# Patient Record
Sex: FEMALE | Race: WHITE
[De-identification: names, ages, dates, MRNs, and addresses within clinical notes are randomized per-mention and may not be internally consistent; named-entity substitution may affect disease eponyms.]

---

## 2020-11-30 NOTE — RAD
Exam: Acute abdominal series

 

INDICATION: Severe abdominal pain, nausea

 

TECHNIQUE: Frontal view of chest with upright and supine views the abdomen

 

Comparisons: None

 

FINDINGS:

The cardiomediastinal silhouette and pulmonary vessels are within normal 

limits.

 

The lung and pleural spaces are clear.

 

Air and stool are noted throughout the colon to level the rectum in a 

nonobstructive bowel gas pattern.

 

No suspicious masses or calcifications.

 

Visualized osseous structures are unremarkable.

 

IMPRESSION:

1.  No acute cardiopulmonary process.

2.  Nonobstructive bowel gas pattern.

 

Electronically signed by: Yajaira Malik MD (11/30/2020 11:23 PM) [facility]

## 2020-11-30 NOTE — EKG
[facility] ED

                    [facility] [address] [place] [id number]

Test Date:    2020               Test Time:    22:23:41

Pat Name:     YAYO MENA        Department:   

Patient ID:   [id number]           Room:          

Gender:       F                        Technician:   DOREEN

:          1965               Requested By: MARINA LUCIANO

Order Number: [id number]            Reading MD:   Patrick Salinas

                                 Measurements

Intervals                              Axis          

Rate:         82                       P:            57

DC:           148                      QRS:          81

QRSD:         84                       T:            46

QT:           346                                    

QTc:          407                                    

                           Interpretive Statements

SINUS RHYTHM

INCOMPLETE RIGHT BUNDLE BRANCH BLOCK

Electronically Signed On 2020 10:34:05 CST by Patrick Salinas

## 2020-11-30 NOTE — PHYS DOC
Past History


Past Medical History:  Diabetes, Hypertension, Hypothyroid, Ovarian Cyst, UTI


Past Surgical History:  , Hysterectomy, Oophorectomy


Past Surgical History


Removal of a 19 inch ovarian cyst 


Alcohol Use:  None





General Adult


EDM:


Chief Complaint:  ABDOMINAL PAIN





HPI:


HPI:


".. I ve been hurting since Saturday.. I been taking some tylenol... but the 

pain in much worse tonight... It seem at lst better... only padmini constant on 

the Rt. and mid.. ... but I play Darts...three times a week.. .  for money.. and

and and competition.. usually at [employer]..and every Monday night at [facility] 

[employer]#..but I went to bend over and  a dart... the pain became so 

severe... I almost passed out..."





Patient is a 55 year old female who presents with above hx and complaints of 

abdomen pain since Sat.  Night.  Patient states pain initially mid and right 

lower abdomen area.  Movement made pain worse.  Has been somewhat intermittent 

but persistent tonight.  Patient has taken Tylenol for pain with some relief but

no relief in the last 2 hours.Patient states she has passed stool and gas today.

 Has had previous abdomen surgeries of a , hysterectomy / oophorectomy 

and removal of a 19 inch ovarian cyst.   .  The   patient has a history of 

diabetes which he takes Metformin.  Patient does have a history of hypertension.

 No history of colitis with her family members.  No history of renal stones with

her or family members.  No history of vaginal discharge or any symptoms of STD. 

No history of bad food intake recently.  No history of immunosuppression. No 

recent travel outside the [place] area.  No specific ill contacts.  Patient 

states she did have a negative Covid test 2 months ago.  Patient normally 

follows at [facility].





Review of Systems:


Review of Systems:


Constitutional:  Denies fever or chills 


Eyes:  Denies change in visual acuity 


HENT:  Denies nasal congestion or sore throat 


Respiratory:  Denies cough or shortness of breath 


Cardiovascular:  Denies chest pain or edema 


GI: Complains of severe abdominal pain, nausea,.  Denies vomiting, bloody stools

or diarrhea 


: Denies dysuria 


Musculoskeletal:  Denies back pain or joint pain 


Integument:  Denies rash 


Neurologic:  Denies headache, focal weakness or sensory changes 


Endocrine:  Denies polyuria or polydipsia 


Lymphatic:  Denies swollen glands 


Psychiatric:  Denies depression or anxiety





Family History:


Family History:


Noncontributory to presentation





Current Medications:


Current Meds:


See nursing for home meds.





Allergies:


Allergies:





Allergies








Coded Allergies Type Severity Reaction Last Updated Verified


 


  No Known Drug Allergies    20 No











Physical Exam:


PE:





Constitutional: Moderate severe acute distress, non-toxic appearance. []


HENT: Normocephalic, atraumatic, bilateral external ears normal, oropharynx 

moist, no oral exudates, nose normal. []


Eyes: PERRLA, EOMI, conjunctiva normal, no discharge.  Glasses


Neck: Normal range of motion, no tenderness, supple, no stridor. [] 


Cardiovascular:Heart rate regular rhythm, no murmur []


Lungs & Thorax:  Bilateral breath sounds equal apex with scattered wheezes on 

auscultation []


Abdomen: Bowel sounds decreased, soft, marked generalized tenderness, no flank 

tenderness, no masses, no pulsatile masses.  Old surgery scars.


Skin: Warm, dry, no erythema, no rash. [] 


Back: No tenderness, no CVA tenderness. [] 


Extremities: No tenderness, no cyanosis, no clubbing, ROM intact, no edema.  

Mild psoas on right.


Neurologic: Alert and oriented X 3, normal motor function, normal sensory 

function, no focal deficits noted. []


Psychologic: Affect anxious, judgement normal, mood normal. []





Current Patient Data:


Vital Signs:





                                   Vital Signs








  Date Time  Temp Pulse Resp B/P (MAP) Pulse Ox O2 Delivery O2 Flow Rate FiO2


 


20 21:26  95 24 187/86 ([phone] 96   











EKG:


EKG:


My interpretation EKG shows a sinus rhythm at 82 bpm.  Is incomplete right 

bundle branch block.  No findings of acute STEMI with contralateral changes.  []





Radiology/Procedures:


Radiology/Procedures:


[[facility] [address], [place] [address] [phone]


                                        


                                 IMAGING REPORT





                                     Signed





PATIENT: YAYO MENA MACCOUNT: [id number]     MRN#: [id number]


: 1965           LOCATION: ER              AGE: 55


SEX: F                    EXAM DT: 20         ACCESSION#: [id number]


STATUS: REG ER            ORD. PHYSICIAN: MARINA LUCIANO MD


REASON: pain,  n, OMNI 300, 75ml & OMNI 240, 30ml


PROCEDURE: CT ABD PELV W/ORAL&IV CONTRAST





CT ABD PELV W/ORAL IV CONTRAST 


 


History: Pain, nausea  


 


Comparison: None.


 


Technique: After administration of intravenous contrast, helical CT of the


abdomen and pelvis was performed from the lung bases through the ischial 


tuberosities. Coronal and sagittal reconstructions were obtained. 75 mL of


Omnipaque 300 were used. One or more of the following dose reduction 


techniques were utilized: Automated exposure control (AEC), Adjustment of 


mA and/or kV according to patient size, Use of iterative reconstruction 


technique such as ASiR, CT scan done according to ALARA and image 


gently/image wisely


 


Abdomen Findings:


The visualized lung bases are clear.


 


The liver, gallbladder, pancreas, spleen, and bilateral adrenal glands are


normal. 


 


Symmetric renal enhancement. There is no focal renal mass. There is no 


hydronephrosis.   


 


The visualized loops of small bowel are normal. The visualized loops of 


large bowel are normal. There is no evidence of bowel obstruction. 


Appendix is normal. 


 


There is no free fluid.


 


There is no mesenteric or retroperitoneal adenopathy. 


 


Ectatic infrarenal abdominal aorta with ulcerated plaques measures 2.9 cm 


in diameter. Mild indistinctness/haziness of the wall of the aorta along 


the anterior aspect of the ectatic segment. Extensive calcified and 


noncalcified atherosclerotic disease, particularly at the aortic 


bifurcation.


 


Pelvis Findings:


Urinary bladder is normal. No pelvic free fluid. There is no pelvic or 


inguinal adenopathy.  


 


There is no acute bony abnormality.  


 


IMPRESSION:


 


1. Normal caliber large and small bowel. Normal appendix. No 


hydronephrosis or obstructing ureteral calculi.


 


2. Ectatic infrarenal abdominal aorta measures 2.9 cm. Mild 


indistinctness/haziness along the anterior wall, nonspecific but 


considerations would include acute or chronic periaortitis/aortitis or 


other aortic pathology. Clinical correlation is advised.


 


3. Extensive aortoiliac atherosclerotic disease, particularly in the 


region of the aortic bifurcation where there is some degree of underlying 


stenosis.


 


Electronically signed by: Parish Rivera MD (2020 2:07 AM) [facility]














DICTATED AND SIGNED BY:     PARISH RIVERA MD


DATE:     20 0200





CC: MARINA LUCIANO MD; PCP,NO ~MTH0 0][facility] [address], [place] [address] [phone]


                                        


                                 IMAGING REPORT





                                     Signed





PATIENT: YAYO MENAUNT: [id number]     MRN#: [id number]


: 1965           LOCATION: ER              AGE: 55


SEX: F                    EXAM DT: 20         ACCESSION#: [id number]


STATUS: PRE ER            ORD. PHYSICIAN: MARINA LUCIANO MD


REASON: severe abd. pain, NAUSEA


PROCEDURE: ACUTE ABDOMEN SERIES





Exam: Acute abdominal series


 


INDICATION: Severe abdominal pain, nausea


 


TECHNIQUE: Frontal view of chest with upright and supine views the abdomen


 


Comparisons: None


 


FINDINGS:


The cardiomediastinal silhouette and pulmonary vessels are within normal 


limits.


 


The lung and pleural spaces are clear.


 


Air and stool are noted throughout the colon to level the rectum in a 


nonobstructive bowel gas pattern.


 


No suspicious masses or calcifications.


 


Visualized osseous structures are unremarkable.


 


IMPRESSION:


1.  No acute cardiopulmonary process.


2.  Nonobstructive bowel gas pattern.


 


Electronically signed by: Yajaira Fraire MD (2020 11:23 PM) [facility]














DICTATED AND SIGNED BY:     YAJAIRA FRAIRE MD


DATE:     20





CC: MARINA LUCIANO MD; PCP,NO ~MTH0 0





Heart Score:


HEART Score for Chest Pain:  








HEART Score for Chest Pain Response (Comments) Value


 


History Moderately Suspicious 1


 


ECG Nonspecific Repolarizatio 1


 


Age >45 - < 65 1


 


Risk Factors                            1 or 2 Risk Factors 1


 


Troponin < Normal Limit 0


 


Total  4








Risk Factors:


Risk Factors:  DM, Current or recent (<one month) smoker, HTN, HLP, family 

history of CAD, obesity.


Risk Scores:


Score 0 - 3:  2.5% MACE over next 6 weeks - Discharge Home


Score 4 - 6:  20.3% MACE over next 6 weeks - Admit for Clinical Observation


Score 7 - 10:  72.7% MACE over next 6 weeks - Early Invasive Strategies





Course & Med Decision Making:


Course & Med Decision Making


Pertinent Labs and Imaging studies reviewed. (See chart for details)





Re-check pt.  Still pain lower abd.   4/10 up 8/10 with movement.  Will order 

CT.  


Plan hold Metaformin.





Patient remain on a clear fluid diet for the next 48 hours.  No solids or milk 

products.  Must allow bowel rest.  Patient take Tylenol and ibuprofen for pain. 

Patient push fluids.   Reexam if no improvement. Hold Metformin.    Practice 

social isolation.  Wear a mask that covers your nose and mouth at all times when

outside your home.  Follow-up primary care.  Return if any concerns.  May take 

Zofran 8 mg up to 4 times a day for active vomiting.  If no improvement must 

have reexam.








Impression:





1. Abdomen Pain


2. Diabetes  glucose  137


3. UTI


4. Viral syndrome








[]





Dragon Disclaimer:


Dragon Disclaimer:


This electronic medical record was generated, in whole or in part, using a voice

 recognition dictation system.





Departure


Departure:


Referrals:  


PCP,BRYN (PCP)


Scripts


Ondansetron Hcl (ZOFRAN) 4 Mg Tablet


8 MG PO QIDPRN PRN for nv, #30 TAB


   Prov: MARINA LUCIANO MD         20 


Cephalexin (KEFLEX) 500 Mg Capsule


500 MG PO TID for uti for 7 Days, BOTTLE


   Prov: MARINA LUCIANO MD         20





Dragon Disclaimer


This chart was dictated in whole or in part using Voice Recognition software in 

a busy, high-work load, and often noisy Emergency Department environment.  It 

may contain unintended and wholly unrecognized errors or omissions.





Dragon Disclaimer


This chart was dictated in whole or in part using Voice Recognition software in 

a busy, high-work load, and often noisy Emergency Department environment.  It 

may contain unintended and wholly unrecognized errors or omissions.





Dragon Disclaimer


This chart was dictated in whole or in part using Voice Recognition software in 

a busy, high-work load, and often noisy Emergency Department environment.  It 

may contain unintended and wholly unrecognized errors or omissions.











MARINA LUCIANO MD           2020 21:45

## 2020-12-01 VITALS
SYSTOLIC BLOOD PRESSURE: 114 MMHG | DIASTOLIC BLOOD PRESSURE: 57 MMHG | SYSTOLIC BLOOD PRESSURE: 114 MMHG | DIASTOLIC BLOOD PRESSURE: 57 MMHG

## 2020-12-01 NOTE — RAD
CT ABD PELV W/ORAL IV CONTRAST 

 

History: Pain, nausea  

 

Comparison: None.

 

Technique: After administration of intravenous contrast, helical CT of the

abdomen and pelvis was performed from the lung bases through the ischial 

tuberosities. Coronal and sagittal reconstructions were obtained. 75 mL of

Omnipaque 300 were used. One or more of the following dose reduction 

techniques were utilized: Automated exposure control (AEC), Adjustment of 

mA and/or kV according to patient size, Use of iterative reconstruction 

technique such as ASiR, CT scan done according to ALARA and image 

gently/image wisely

 

Abdomen Findings:

The visualized lung bases are clear.

 

The liver, gallbladder, pancreas, spleen, and bilateral adrenal glands are

normal. 

 

Symmetric renal enhancement. There is no focal renal mass. There is no 

hydronephrosis.   

 

The visualized loops of small bowel are normal. The visualized loops of 

large bowel are normal. There is no evidence of bowel obstruction. 

Appendix is normal. 

 

There is no free fluid.

 

There is no mesenteric or retroperitoneal adenopathy. 

 

Ectatic infrarenal abdominal aorta with ulcerated plaques measures 2.9 cm 

in diameter. Mild indistinctness/haziness of the wall of the aorta along 

the anterior aspect of the ectatic segment. Extensive calcified and 

noncalcified atherosclerotic disease, particularly at the aortic 

bifurcation.

 

Pelvis Findings:

Urinary bladder is normal. No pelvic free fluid. There is no pelvic or 

inguinal adenopathy.  

 

There is no acute bony abnormality.  

 

IMPRESSION:

 

1. Normal caliber large and small bowel. Normal appendix. No 

hydronephrosis or obstructing ureteral calculi.

 

2. Ectatic infrarenal abdominal aorta measures 2.9 cm. Mild 

indistinctness/haziness along the anterior wall, nonspecific but 

considerations would include acute or chronic periaortitis/aortitis or 

other aortic pathology. Clinical correlation is advised.

 

3. Extensive aortoiliac atherosclerotic disease, particularly in the 

region of the aortic bifurcation where there is some degree of underlying 

stenosis.

 

Electronically signed by: Cecilio Rivera MD (12/1/2020 2:07 AM) [facility]

## 2021-04-27 ENCOUNTER — HOSPITAL ENCOUNTER (OUTPATIENT)
Dept: HOSPITAL 63 - MAMMO | Age: 56
End: 2021-04-27
Payer: COMMERCIAL

## 2021-04-27 DIAGNOSIS — M47.816: ICD-10-CM

## 2021-04-27 DIAGNOSIS — Z12.31: Primary | ICD-10-CM

## 2021-04-27 PROCEDURE — 77067 SCR MAMMO BI INCL CAD: CPT

## 2021-04-27 PROCEDURE — 72100 X-RAY EXAM L-S SPINE 2/3 VWS: CPT

## 2021-04-28 NOTE — RAD
EXAM: Bilateral screening mammogram.



HISTORY: 55-year-old female presents for screening mammography.



TECHNIQUE: Full-field digital craniocaudal and mediolateral oblique views of both breasts are obtaine
d for evaluation. Computer aided detection was applied.



COMPARISON: None. This exam serves as a baseline mammogram.



BREAST PARENCHYMAL DENSITY: Level B - Scattered fibroglandular densities.



FINDINGS: There is a cluster of calcifications with indeterminate morphology within the posterior asp
ect of the left breast slightly lateral to the nipple in the craniocaudal projection. There are addit
ional scattered calcifications with benign morphology. There is no mass or suspicious architectural d
istortion.



IMPRESSION: BI-RADS Category 0: Incomplete. Additional imaging needed.



RECOMMENDATION: Further evaluation with a full field true and view and spot magnification craniocauda
l view of the left breast to assess my calcifications described above is recommended.



If your mammogram demonstrates that you have dense breast tissue, which could hide abnormalities, and
 if you have other risk factors for breast cancer that have been identified, you might benefit from s
upplemental screening tests that may be suggested by your ordering physician.  Dense breast tissue, i
n and of itself, is a relatively common condition.  This information is not provided to cause undue c
oncern, but rather to raise your awareness and to promote discussion with your physician regarding th
e presence of other risk factors, in addition to dense breast tissue. A report of your mammography re
sults will be sent to you and your physician.  You should contact your physician if you have any ques
tions or concerns regarding this report.  



Mammography is a sensitive method for finding small breast cancers, but it does not detect them all a
nd is not a substitute for careful clinical examination.  A negative mammogram does not negate a clin
ically suspicious finding and should not result in delay in biopsying a clinically suspicious abnorma
lity.



PQRS compliance statement -  Patient information was entered into a reminder system with a target due
 date for the next mammogram. 



"Our facility is accredited by the American College of [employer] Mammography Program."



Electronically signed by: Shanae Crowley MD (4/28/2021 8:50 AM) [id number]

## 2021-04-28 NOTE — RAD
3 views lumbar spine dated 4/27/2021.



No comparison available.



CLINICAL INDICATION: Low back pain.



FINDINGS:



3 views lumbar spine show normal sagittal alignment. Vertebral body heights are maintained. Mild endp
late hypertrophic changes throughout with moderate arthrosis lower lumbar apophyseal joints. No evide
nce of fracture. There is moderate to severe disc space narrowing at L5-S1



IMPRESSION:

1. No acute radiographic abnormality.

2. Mild lower lumbar spondylosis.



Electronically signed by: Pardeep Noonan MD (4/28/2021 4:52 AM) [facility]

## 2021-05-25 ENCOUNTER — HOSPITAL ENCOUNTER (OUTPATIENT)
Dept: HOSPITAL 63 - MAMMO | Age: 56
End: 2021-05-25
Attending: NURSE PRACTITIONER
Payer: COMMERCIAL

## 2021-05-25 DIAGNOSIS — R92.1: Primary | ICD-10-CM

## 2021-05-25 PROCEDURE — 77065 DX MAMMO INCL CAD UNI: CPT

## 2021-05-25 NOTE — RAD
EXAM: Left breast diagnostic mammogram.



HISTORY: 55-year-old female presents for evaluation of left breast microcalcifications.



TECHNIQUE: Spot magnification views of the left breast are obtained.



COMPARISON: 4/27/2021



BREAST PARENCHYMAL DENSITY: Level B - Scattered fibroglandular densities.



FINDINGS: There is a cluster of microcalcifications within the posterior left breast slightly lateral
 to the nipple line. These demonstrate benign morphology and dedicated magnification views. There are
 few additional scattered calcifications with similar morphology. No mass or architectural distortion
 is seen.



IMPRESSION: BI-RADS Category 2: Benign finding(s). 



RECOMMENDATION: Annual mammography is recommended.



If your mammogram demonstrates that you have dense breast tissue, which could hide abnormalities, and
 if you have other risk factors for breast cancer that have been identified, you might benefit from s
upplemental screening tests that may be suggested by your ordering physician.  Dense breast tissue, i
n and of itself, is a relatively common condition.  This information is not provided to cause undue c
oncern, but rather to raise your awareness and to promote discussion with your physician regarding th
e presence of other risk factors, in addition to dense breast tissue. A report of your mammography re
sults will be sent to you and your physician.  You should contact your physician if you have any ques
tions or concerns regarding this report.  



Mammography is a sensitive method for finding small breast cancers, but it does not detect them all a
nd is not a substitute for careful clinical examination.  A negative mammogram does not negate a clin
ically suspicious finding and should not result in delay in biopsying a clinically suspicious abnorma
lity.



PQRS compliance statement -  Patient information was entered into a reminder system with a target due
 date for the next mammogram. 



"Our facility is accredited by the American College of [employer] Mammography Program."

 



Electronically signed by: Shanae Crowley MD (5/25/2021 12:45 PM) [id number]